# Patient Record
Sex: MALE | Race: WHITE
[De-identification: names, ages, dates, MRNs, and addresses within clinical notes are randomized per-mention and may not be internally consistent; named-entity substitution may affect disease eponyms.]

---

## 2017-06-26 ENCOUNTER — HOSPITAL ENCOUNTER (EMERGENCY)
Dept: HOSPITAL 47 - EC | Age: 42
Discharge: HOME | End: 2017-06-26
Payer: COMMERCIAL

## 2017-06-26 VITALS
HEART RATE: 62 BPM | TEMPERATURE: 97.9 F | DIASTOLIC BLOOD PRESSURE: 55 MMHG | RESPIRATION RATE: 14 BRPM | SYSTOLIC BLOOD PRESSURE: 105 MMHG

## 2017-06-26 DIAGNOSIS — F17.200: ICD-10-CM

## 2017-06-26 DIAGNOSIS — Z88.0: ICD-10-CM

## 2017-06-26 DIAGNOSIS — S23.3XXA: Primary | ICD-10-CM
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
## 2017-06-26 PROCEDURE — 96372 THER/PROPH/DIAG INJ SC/IM: CPT

## 2017-06-26 PROCEDURE — 72110 X-RAY EXAM L-2 SPINE 4/>VWS: CPT

## 2017-06-26 PROCEDURE — 99283 EMERGENCY DEPT VISIT LOW MDM: CPT

## 2017-06-26 PROCEDURE — 72072 X-RAY EXAM THORAC SPINE 3VWS: CPT

## 2017-06-26 NOTE — ED
Back Pain HPI





- General


Chief Complaint: Back Pain/Injury


Stated Complaint: Back Pain


Time Seen by Provider: 06/26/17 16:36


Source: patient


Limitations: no limitations





- History of Present Illness


Initial Comments: 


Patient is a 42-year-old male presenting to the emergency department with 

complaints of mid back pain.  Patient states he was at the [facility] 

yesterday and first tripped over a cord while he might hurt his back.  Patient 

then participated in a [employer] rides which exacerbated his back pain.  Patient 

states he used to have problems with back pain but not anymore.  Patient is 

currently rating his pain 6 out of 10, described as sharp, exacerbated with 

flexion and twisting.  Patient denies recent illness, fevers, nausea, vomiting, 

shortness of breath, chest pain, abdominal pain, numbness or tingling, urinary 

or fecal incontinence, or saddle anesthesia.  Patient is ambulatory.  Patient 

states he took Tylenol prior to arrival.








- Related Data


 Home Medications











 Medication  Instructions  Recorded  Confirmed


 


No Known Home Medications [No  06/26/17 06/26/17





Known Home Medications]   











 Allergies











Allergy/AdvReac Type Severity Reaction Status Date / Time


 


Penicillins Allergy  Anaphylaxis Verified 06/26/17 16:43














Review of Systems


ROS Statement: 


Those systems with pertinent positive or pertinent negative responses have been 

documented in the HPI.





ROS Other: All systems not noted in ROS Statement are negative.





Past Medical History


Past Medical History: No Reported History


History of Any Multi-Drug Resistant Organisms: None Reported


Past Surgical History: No Surgical Hx Reported


Additional Past Surgical History / Comment(s): smokes 1 ppd


Past Psychological History: No Psychological Hx Reported


Smoking Status: Current every day smoker


Past Alcohol Use History: None Reported


Past Drug Use History: None Reported





- Past Family History


  ** Mother


History Unknown: Yes (Cardiac history)





General Exam





- General Exam Comments


Initial Comments: 


GENERAL: Pt awake and alert, well-appearing, well-nourished, and in no acute 

distress.


HEAD: Atraumatic, normocephalic.


EYES: Pupils equal, round, and reactive to light, extraocular movements intact, 

sclera anicteric, conjunctiva are normal.


ENT: Oropharynx clear without exudates.  Moist mucous membranes. 


NECK:Normal range of motion, supple without lymphadenopathy. 


LUNGS: Breath sounds clear to auscultation bilaterally.  No wheezes, rales, or 

rhonchi.


HEART: Heart S1, S2, no S3 or S4.  Regular rate and rhythm.  No murmurs, rubs 

or gallops.


ABDOMEN: Soft, nontender, nondistended, normoactive bowel sounds.  No guarding, 

no rebound.  No masses or organomegaly appreciated. 


Spine: Full range of motion of spine with flexion and extension even though it'

s painful.  Straight leg raise negative bilaterally.  5 out of 5 strength in 

hips/knees/ankles.  Normal gait.  Vertebral tenderness to mid back.


EXTREMITIES: 2+ peripheral pulses. No edema. No calf tenderness.


NEUROLOGICAL: Pt oriented x 3.  No focal deficits noted.  Strength and 

sensation grossly intact.


PSYCH: Normal mood, normal affect.


SKIN: Warm, dry, intact. Normal turgor. No rashes or lesions.








Limitations: no limitations





Course


 Vital Signs











  06/26/17 06/26/17





  16:09 17:35


 


Temperature 98.4 F 97.9 F


 


Pulse Rate 70 62


 


Respiratory 20 14





Rate  


 


Blood Pressure 101/59 105/55


 


O2 Sat by Pulse 98 96





Oximetry  














Medical Decision Making





- Medical Decision Making


Acute mid back pain suspect secondary to strain.  Spine and lumbar X-ray 

negative for acute fracture or dislocation.  Patient given Toradol IM in the 

emergency department improvement.  Patient instructed to follow-up with primary 

care physician or orthopedic service if pain does not improve.  Discharge 

instructions and return parameters reviewed.








- Radiology Data


Radiology results: report reviewed


X-ray thoracic spine x-ray lumbar spine: Thoracic and lumbar spine without 

vertebral compression collapse or malalignment.  Mild degenerative disc disease 

L5 to S1.  Slight rotary levoconvex curvature/scoliosis of the lumbar spine.





Disposition


Clinical Impression: 


 Thoracic back sprain





Disposition: HOME SELF-CARE


Condition: Good


Instructions:  Acute Low Back Pain (ED)


Additional Instructions: 


Continue Tylenol or Motrin for pain.  May apply warm compresses or ice for 

relief.  Follow-up with primary care physician as directed.  If pain persists 

follow-up with orthopedic service.


Referrals: 


Cheri Johnson MD [Primary Care Provider] - 1-2 days


Hermes Portillo MD [STAFF PHYSICIAN] - 1-2 days


Time of Disposition: 17:10

## 2017-06-26 NOTE — XR
EXAMINATION TYPE:

 

3 views thoracic spine.

5 views lumbar spine.

 

DATE OF EXAM: 6/26/2017

 

COMPARISON: NONE

 

HISTORY: 42-year-old male with back pain after fall yesterday

 

FINDINGS: 

 

Thoracic spine:

12 rib-bearing thoracic vertebral bodies. All pedicles are visualized. Vertebral body heights are pre
served and alignment is maintained.

 

Lumbar spine:

Slight rotary levoconvex curvature of the lumbar spine with 5 lumbar type vertebral bodies. Mild dege
nerative disc disease at L5-S1 with a limbus vertebra of the inferior L5. Vertebral body heights are 
preserved and alignment is maintained.

 

 

IMPRESSION: 

1. Thoracic and lumbar spine without vertebral compression collapse or malalignment.

2. Mild degenerative disc disease L5-S1.

3. Slight rotary levoconvex curvature/scoliosis of the lumbar spine.

## 2018-06-15 ENCOUNTER — HOSPITAL ENCOUNTER (EMERGENCY)
Dept: HOSPITAL 47 - EC | Age: 43
Discharge: HOME | End: 2018-06-15
Payer: COMMERCIAL

## 2018-06-15 VITALS
RESPIRATION RATE: 16 BRPM | SYSTOLIC BLOOD PRESSURE: 114 MMHG | DIASTOLIC BLOOD PRESSURE: 70 MMHG | TEMPERATURE: 98 F | HEART RATE: 57 BPM

## 2018-06-15 DIAGNOSIS — F17.210: ICD-10-CM

## 2018-06-15 DIAGNOSIS — S01.511A: Primary | ICD-10-CM
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
## 2018-06-15 DIAGNOSIS — Z97.2: ICD-10-CM

## 2018-06-15 DIAGNOSIS — Z88.0: ICD-10-CM

## 2018-06-15 PROCEDURE — 12011 RPR F/E/E/N/L/M 2.5 CM/<: CPT

## 2018-06-15 PROCEDURE — 99283 EMERGENCY DEPT VISIT LOW MDM: CPT

## 2018-06-15 NOTE — ED
ENT HPI





- General


Chief complaint: Dental/Oral


Stated complaint: Mouth Injury


Time Seen by Provider: 06/15/18 04:35


Source: patient


Mode of arrival: ambulatory


Limitations: no limitations





- History of Present Illness


Initial comments: 





This patient is a 43-year-old man who states that he was working on his car 

when he sustained a lip laceration.  The patient states that he was in the 

process of swelling and hammer back and it struck his upper lip.  He noticed 

that there was a laceration and he presents here to see about having this 

repaired.  Patient denies any other injury.  Patient states that he was wearing 

dentures and that these were cracked.  He denies any injury other than that.


MD complaint: other (Lip laceration)


Onset/Timin


-: hour(s)


Location: upper lip


Severity: mild


Quality: dull


Consistency: constant


Improves with: none


Worsens with: none





- Related Data


 Previous Rx's











 Medication  Instructions  Recorded


 


Cephalexin [Keflex] 500 mg PO Q6HR #28 cap 06/15/18


 


Ibuprofen [Motrin] 600 mg PO Q8HR PRN #20 tab 06/15/18











 Allergies











Allergy/AdvReac Type Severity Reaction Status Date / Time


 


Penicillins Allergy  Anaphylaxis Verified 06/15/18 04:29














Review of Systems


ROS Statement: 


Those systems with pertinent positive or pertinent negative responses have been 

documented in the HPI.





ROS Other: All systems not noted in ROS Statement are negative.


Constitutional: Denies: fever


ENT: Reports: other (Lip laceration).  Denies: throat pain, epistaxis


Respiratory: Denies: cough, dyspnea


Hematological/Lymphatic: Denies: easy bleeding





Past Medical History


Past Medical History: No Reported History


History of Any Multi-Drug Resistant Organisms: None Reported


Past Surgical History: No Surgical Hx Reported


Additional Past Surgical History / Comment(s): smokes 1 ppd


Past Psychological History: No Psychological Hx Reported


Smoking Status: Current every day smoker


Past Alcohol Use History: None Reported


Past Drug Use History: None Reported





- Past Family History


  ** Mother


History Unknown: Yes (Cardiac history)





General Exam


Limitations: no limitations


General appearance: alert, in no apparent distress


Head exam: Present: atraumatic, normocephalic


Eye exam: Present: normal appearance.  Absent: scleral icterus, conjunctival 

injection


ENT exam: Present: other (The patient has an approximately 2 cm laceration to 

the upper lip bilaterally.  The oral mucosa.)


Skin exam: Present: warm, dry, normal color





Course


 Vital Signs











  06/15/18





  04:26


 


Temperature 98 F


 


Pulse Rate 57 L


 


Respiratory 16





Rate 


 


Blood Pressure 114/70


 


O2 Sat by Pulse 98





Oximetry 














Procedures





- Laceration


  ** Laceration #1


Consent Obtained: verbal consent


Indication: laceration


Site: lip


Description: linear, clean


Depth: simple, single layer


Anesthetic Used: lidocaine 1%


Anesthesia Technique: local infiltration


Type of Sutures: vicryl


Size of Sutures: 5-0


Number of Sutures: 2


Technique: simple, interrupted


Patient Tolerated Procedure: well, no complications





Disposition


Clinical Impression: 


 Lip laceration





Disposition: HOME SELF-CARE


Condition: Good


Instructions:  Laceration (ED)


Prescriptions: 


Cephalexin [Keflex] 500 mg PO Q6HR #28 cap


Ibuprofen [Motrin] 600 mg PO Q8HR PRN #20 tab


 PRN Reason: Pain


Is patient prescribed a controlled substance at d/c from ED?: No


Referrals: 


None,Stated [Primary Care Provider] - 1-2 days

## 2019-07-05 ENCOUNTER — HOSPITAL ENCOUNTER (EMERGENCY)
Dept: HOSPITAL 47 - EC | Age: 44
Discharge: HOME | End: 2019-07-05
Payer: COMMERCIAL

## 2019-07-05 VITALS
HEART RATE: 67 BPM | TEMPERATURE: 97.5 F | DIASTOLIC BLOOD PRESSURE: 76 MMHG | RESPIRATION RATE: 18 BRPM | SYSTOLIC BLOOD PRESSURE: 118 MMHG

## 2019-07-05 DIAGNOSIS — S39.012A: Primary | ICD-10-CM
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
## 2019-07-05 DIAGNOSIS — Z88.0: ICD-10-CM

## 2019-07-05 DIAGNOSIS — F17.200: ICD-10-CM
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
## 2019-07-05 PROCEDURE — 99283 EMERGENCY DEPT VISIT LOW MDM: CPT

## 2019-07-05 NOTE — ED
Back Pain HPI





- General


Chief Complaint: Back Pain/Injury


Stated Complaint: Back Pain


Time Seen by Provider: 19 05:39


Source: patient, family


Limitations: no limitations





- History of Present Illness


Initial Comments: 





This patient is a 44-year-old man who believes that he strained his back.  The 

patient states that he had gone to help his father with some yard work.  He st

ates that he had pulled palate out of a grassy area, and believes he strained 

his low back.  He states that that had been couple of days ago and then over the

course of the next day he developed stiffness and aching to the low back.  He is

not having any weakness or numbness of the extremities.  No change in urination 

or bowel movements.  No abdominal pain.  No saddle anesthesia.


MD Complaint: back injury


Onset/Timin


-: days(s)


Similar Symptoms Previously: Yes


Place: street


Radiation: none


Severity: moderate


Quality: aching


Consistency: constant


Improves With: walking, other (Standing)


Worsens With: sitting upright


Context: while lifting


Associated Symptoms: denies other symptoms





- Related Data


                                  Previous Rx's











 Medication  Instructions  Recorded


 


Cephalexin [Keflex] 500 mg PO Q6HR #28 cap 06/15/18


 


Ibuprofen [Motrin] 600 mg PO Q8HR PRN #20 tab 06/15/18


 


Ibuprofen [Motrin] 600 mg PO Q8HR PRN #20 tab 19


 


Methocarbamol [Robaxin-750] 750 mg PO TID PRN #30 tablet 19











                                    Allergies











Allergy/AdvReac Type Severity Reaction Status Date / Time


 


Penicillins Allergy  Anaphylaxis Verified 19 05:07














Review of Systems


ROS Statement: 


Those systems with pertinent positive or pertinent negative responses have been 

documented in the HPI.





ROS Other: All systems not noted in ROS Statement are negative.


Constitutional: Denies: fever, chills, weakness


Respiratory: Denies: cough, dyspnea


Cardiovascular: Denies: chest pain, edema


Gastrointestinal: Denies: abdominal pain


Genitourinary: Denies: dysuria, hematuria, testicular pain, testicular mass


Musculoskeletal: Reports: as per HPI, back pain


Skin: Denies: rash


Neurological: Denies: weakness, numbness, paresthesias





Past Medical History


Past Medical History: No Reported History


History of Any Multi-Drug Resistant Organisms: None Reported


Past Surgical History: No Surgical Hx Reported


Additional Past Surgical History / Comment(s): smokes 1 ppd


Past Psychological History: No Psychological Hx Reported


Smoking Status: Current every day smoker


Past Alcohol Use History: None Reported


Past Drug Use History: None Reported





- Past Family History


  ** Mother


History Unknown: Yes (Cardiac history)





General Exam


Limitations: no limitations


General appearance: alert, in no apparent distress


Respiratory exam: Present: normal lung sounds bilaterally.  Absent: respiratory 

distress, wheezes, rales, rhonchi, stridor


Cardiovascular Exam: Present: regular rate, normal rhythm, normal heart sounds. 

Absent: systolic murmur, diastolic murmur, gallop


GI/Abdominal exam: Present: soft.  Absent: distended, tenderness, guarding, 

rebound, rigid, pulsatile mass


Extremities exam: Present: normal inspection, normal capillary refill.  Absent: 

pedal edema, calf tenderness


Back exam: Present: normal inspection, muscle spasm.  Absent: CVA tenderness 

(R), CVA tenderness (L), vertebral tenderness


Neurological exam: Present: alert, normal gait, reflexes normal.  Absent: motor 

sensory deficit


Skin exam: Present: warm, dry, intact, normal color.  Absent: rash





Course


                                   Vital Signs











  19





  05:03


 


Temperature 97.5 F L


 


Pulse Rate 67


 


Respiratory 18





Rate 


 


Blood Pressure 118/76


 


O2 Sat by Pulse 99





Oximetry 














Disposition


Clinical Impression: 


 Strain of lumbar region





Disposition: HOME SELF-CARE


Condition: Good


Instructions (If sedation given, give patient instructions):  Acute Low Back 

Pain (ED)


Prescriptions: 


Ibuprofen [Motrin] 600 mg PO Q8HR PRN #20 tab


 PRN Reason: Pain


Methocarbamol [Robaxin-750] 750 mg PO TID PRN #30 tablet


 PRN Reason: pain


Is patient prescribed a controlled substance at d/c from ED?: No


Referrals: 


None,Stated [Primary Care Provider] - 1-2 days

## 2021-07-16 ENCOUNTER — HOSPITAL ENCOUNTER (EMERGENCY)
Dept: HOSPITAL 47 - EC | Age: 46
Discharge: HOME | End: 2021-07-16
Payer: COMMERCIAL

## 2021-07-16 VITALS
DIASTOLIC BLOOD PRESSURE: 90 MMHG | TEMPERATURE: 98.1 F | HEART RATE: 57 BPM | RESPIRATION RATE: 20 BRPM | SYSTOLIC BLOOD PRESSURE: 107 MMHG

## 2021-07-16 DIAGNOSIS — F17.210: ICD-10-CM

## 2021-07-16 DIAGNOSIS — M54.5: Primary | ICD-10-CM

## 2021-07-16 DIAGNOSIS — Z88.0: ICD-10-CM

## 2021-07-16 PROCEDURE — 99283 EMERGENCY DEPT VISIT LOW MDM: CPT

## 2021-07-16 PROCEDURE — 96372 THER/PROPH/DIAG INJ SC/IM: CPT

## 2021-07-16 NOTE — ED
General Adult HPI





- General


Chief complaint: Back Pain/Injury


Stated complaint: Back pain


Time Seen by Provider: 07/16/21 06:01


Source: patient, RN notes reviewed


Mode of arrival: ambulatory


Limitations: no limitations





- History of Present Illness


Initial comments: 





46-year-old male without significant past medical history presents to the 

emergency room for a chief complaint of back pain.  Patient reports he has left-

sided back pain.  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 

and a metal factory lifting heavy metals.  Patient reports that this morning 

when he woke up his pain was worse in his back felt more stiff.  States this 

feels like exactly a few years ago when he strained his back.  Patient denies 

bladder or bowel changes, saddle anesthesia, weakness of the lower extremities, 

radiating pain, or fevers.  RPatient has no other complaints at this time 

including shortness of breath, chest pain, abdominal pain, nausea or vomiting, 

headache, or visual changes.





- Related Data


                                  Previous Rx's











 Medication  Instructions  Recorded


 


Cephalexin [Keflex] 500 mg PO Q6HR #28 cap 06/15/18


 


Ibuprofen [Motrin] 600 mg PO Q8HR PRN #20 tab 06/15/18


 


Ibuprofen [Motrin] 600 mg PO Q8HR PRN #20 tab 07/05/19


 


Methocarbamol [Robaxin-750] 750 mg PO TID PRN #30 tablet 07/05/19


 


Cyclobenzaprine [Flexeril] 10 mg PO TID #14 tab 07/16/21











                                    Allergies











Allergy/AdvReac Type Severity Reaction Status Date / Time


 


Penicillins Allergy  Anaphylaxis Verified 07/16/21 05:34














Review of Systems


ROS Statement: 


Those systems with pertinent positive or pertinent negative responses have been 

documented in the HPI.





ROS Other: All systems not noted in ROS Statement are negative.





Past Medical History


Past Medical History: No Reported History


History of Any Multi-Drug Resistant Organisms: None Reported


Past Surgical History: No Surgical Hx Reported


Additional Past Surgical History / Comment(s): smokes 1 ppd


Past Psychological History: No Psychological Hx Reported


Smoking Status: Current every day smoker


Past Alcohol Use History: None Reported


Past Drug Use History: None Reported





- Past Family History


  ** Mother


History Unknown: Yes (Cardiac history)





General Exam


Limitations: no limitations


General appearance: alert, in no apparent distress


Head exam: Present: atraumatic, normocephalic, normal inspection


Eye exam: Present: normal appearance, PERRL, EOMI.  Absent: scleral icterus, 

conjunctival injection, periorbital swelling


ENT exam: Present: normal exam, mucous membranes moist


Neck exam: Present: normal inspection, full ROM.  Absent: tenderness


Respiratory exam: Present: normal lung sounds bilaterally.  Absent: respiratory 

distress, wheezes


Cardiovascular Exam: Present: regular rate, normal rhythm, normal heart sounds


GI/Abdominal exam: Present: soft, normal bowel sounds.  Absent: distended, 

tenderness, guarding, rebound, rigid


Extremities exam: Present: normal capillary refill (Capillary refill less than 2

 seconds, DP pulse 2+ in bilateral lower extremities.)


Back exam: Present: paraspinal tenderness (L Sided lumbar paraspinal tenderness 

near SI joint.).  Absent: vertebral tenderness





Course





                                   Vital Signs











  07/16/21





  05:30


 


Temperature 98.1 F


 


Pulse Rate 57 L


 


Respiratory 20





Rate 


 


Blood Pressure 107/90


 


O2 Sat by Pulse 100





Oximetry 














Medical Decision Making





- Medical Decision Making





Patient is well-appearing.  Patient presents for left-sided back pain 

exacerbated by lifting.  He denies red flag symptoms.  Patient is able to flex 

lumbar spine to about 45.  Pain is worsened with movement such as getting out 

of bed and standing up straight.  Neurovascular status intact in bilateral lower

 extremities.  Patient denies any injuries.  Patient was given Toradol and 

Norflex.  He will be discharged home with muscle relaxers but is aware he cannot

 drive while taking this.  Patient will be given extra days of work per his 

request.  He will return for any worsening symptoms.  He will follow up with or

thopedics.





Disposition


Clinical Impression: 


 Mechanical back pain





Disposition: HOME SELF-CARE


Condition: Good


Instructions (If sedation given, give patient instructions):  Acute Low Back 

Pain (ED)


Additional Instructions: 


Take Motrin and Tylenol for pain alternating every 3 hours as needed.  Take 

muscle relaxer as needed but do not drive or operate machinery while taking 

this.  Follow-up with your doctor as well as orthopedics in one to 2 days.  You 

may need MRI if pain is persistent.  Return to the emergency room for any 

worsening symptoms.


Prescriptions: 


Cyclobenzaprine [Flexeril] 10 mg PO TID #14 tab


Is patient prescribed a controlled substance at d/c from ED?: No


Referrals: 


Shiraz Seo MD [STAFF PHYSICIAN] - 1-2 days


Time of Disposition: 06:49

## 2021-08-05 ENCOUNTER — HOSPITAL ENCOUNTER (EMERGENCY)
Dept: HOSPITAL 47 - EC | Age: 46
Discharge: HOME | End: 2021-08-05
Payer: COMMERCIAL

## 2021-08-05 VITALS — TEMPERATURE: 98.2 F | DIASTOLIC BLOOD PRESSURE: 82 MMHG | SYSTOLIC BLOOD PRESSURE: 126 MMHG

## 2021-08-05 VITALS — HEART RATE: 75 BPM | RESPIRATION RATE: 18 BRPM

## 2021-08-05 DIAGNOSIS — Z88.0: ICD-10-CM

## 2021-08-05 DIAGNOSIS — S61.411A: Primary | ICD-10-CM

## 2021-08-05 DIAGNOSIS — F17.200: ICD-10-CM

## 2021-08-05 DIAGNOSIS — Z23: ICD-10-CM
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
## 2021-08-05 PROCEDURE — 90715 TDAP VACCINE 7 YRS/> IM: CPT

## 2021-08-05 PROCEDURE — 73130 X-RAY EXAM OF HAND: CPT

## 2021-08-05 PROCEDURE — 12002 RPR S/N/AX/GEN/TRNK2.6-7.5CM: CPT

## 2021-08-05 PROCEDURE — 99283 EMERGENCY DEPT VISIT LOW MDM: CPT

## 2021-08-05 PROCEDURE — 90471 IMMUNIZATION ADMIN: CPT

## 2021-08-05 NOTE — ED
General Adult HPI





- General


Chief complaint: Wound/Laceration


Stated complaint: R hand laceration


Time Seen by Provider: 08/05/21 14:00


Source: patient, RN notes reviewed


Mode of arrival: ambulatory


Limitations: no limitations





- History of Present Illness


Initial comments: 





46-year-old male presents to the emergency room for a chief complaint of 

laceration of the right hand.  Patient was moving a fridge and did not notice 

there is a piece of metal that cut his right hand.  He denies any difficulty 

moving the fingers.  Tetanus is up-to-date 3 years ago.  Patient wanted to glue 

it but thought it might require sutures.Patient has no other complaints at this 

time including shortness of breath, chest pain, abdominal pain, nausea or 

vomiting, headache, or visual changes.





- Related Data


                                  Previous Rx's











 Medication  Instructions  Recorded


 


Cephalexin [Keflex] 500 mg PO Q6HR #28 cap 06/15/18


 


Ibuprofen [Motrin] 600 mg PO Q8HR PRN #20 tab 06/15/18


 


Ibuprofen [Motrin] 600 mg PO Q8HR PRN #20 tab 07/05/19


 


Methocarbamol [Robaxin-750] 750 mg PO TID PRN #30 tablet 07/05/19


 


Cyclobenzaprine [Flexeril] 10 mg PO TID #14 tab 07/16/21











                                    Allergies











Allergy/AdvReac Type Severity Reaction Status Date / Time


 


Penicillins Allergy  Anaphylaxis Verified 08/05/21 13:49














Review of Systems


ROS Statement: 


Those systems with pertinent positive or pertinent negative responses have been 

documented in the HPI.





ROS Other: All systems not noted in ROS Statement are negative.





Past Medical History


Past Medical History: No Reported History


History of Any Multi-Drug Resistant Organisms: None Reported


Past Surgical History: No Surgical Hx Reported


Additional Past Surgical History / Comment(s): smokes 1 ppd


Past Psychological History: No Psychological Hx Reported


Smoking Status: Current every day smoker


Past Alcohol Use History: None Reported


Past Drug Use History: None Reported





- Past Family History


  ** Mother


History Unknown: Yes (Cardiac history)





General Exam


Limitations: no limitations


General appearance: alert, in no apparent distress


Head exam: Present: atraumatic, normocephalic, normal inspection


Eye exam: Present: normal appearance, PERRL, EOMI.  Absent: scleral icterus, 

conjunctival injection, periorbital swelling


ENT exam: Present: normal exam, mucous membranes moist


Neck exam: Present: normal inspection, full ROM.  Absent: tenderness, 

meningismus, lymphadenopathy


Respiratory exam: Present: normal lung sounds bilaterally.  Absent: respiratory 

distress, wheezes, rales, rhonchi, stridor


Cardiovascular Exam: Present: regular rate, normal rhythm, normal heart sounds. 

 Absent: systolic murmur, diastolic murmur, rubs, gallop, clicks


Extremities exam: Present: full ROM (Full range of motion of all digits of the 

right hand.), normal capillary refill (Capillary refill less than 2 seconds in 

the right hand.  Radial pulse 2+.), other (Patient has a 3 cm laceration on the 

dorsal aspect of the right hand between the first and second metacarpals)





Course


                                   Vital Signs











  08/05/21 08/05/21





  13:49 15:01


 


Temperature 98.2 F 98.2 F


 


Pulse Rate 69 75


 


Respiratory 16 18





Rate  


 


Blood Pressure 126/82 


 


O2 Sat by Pulse 97 98





Oximetry  














Procedures





- Laceration


  ** Laceration #1


Consent Obtained: verbal consent


Indication: laceration


Site: hand


Size (cm): 3


Description: linear


Depth: simple, single layer


Anesthetic Used: lidocaine 1%


Anesthesia Technique: local infiltration


Amount (mls): 3


Pre-repair: wound explored, irrigated extensively


Type of Sutures: nylon


Size of Sutures: 5-0


Number of Sutures: 5


Patient Tolerated Procedure: well, no complications





Medical Decision Making





- Medical Decision Making





  X-ray shows no acute fracture.  There is linear lucency consistent with 

laceration over base of the first metacarpal without radiodense foreign body.  

Patient's wound was irrigated with saline pressure irrigation.  Laceration was 

repaired of the right hand.  Discussed care parameters.  Discussed following up 

with primary care for recheck and returning in 7-10 days for suture removal





Disposition


Clinical Impression: 


 Laceration





Disposition: HOME SELF-CARE


Condition: Good


Instructions (If sedation given, give patient instructions):  Care For Your 

Stitches (ED), Laceration (ED)


Additional Instructions: 


Keep area clean.  Apply antibiotic ointment as needed.  Monitor for signs of 

infection such as spreading or streaking redness, drainage, or fever and return 

if these occur.  Return if you have any other worsening symptoms.


Is patient prescribed a controlled substance at d/c from ED?: No


Referrals: 


Sukh Vega MD [REFERRING] - 1-2 days


Time of Disposition: 14:14

## 2021-08-05 NOTE — XR
EXAMINATION TYPE: XR hand complete RT

 

DATE OF EXAM: 8/5/2021

 

CLINICAL HISTORY: Laceration injury with pain

 

TECHNIQUE:  Frontal, lateral and oblique images of the right hand are obtained.

 

COMPARISON: None.

 

FINDINGS:  There is no acute fracture/dislocation evident in the right hand. The joint spaces in the 
hand appear within normal limits. Linear lucency consistent with laceration over base of first metaca
rpal identified. No suspicious radiodense foreign body seen.

 

IMPRESSION: As above.

## 2021-11-29 ENCOUNTER — HOSPITAL ENCOUNTER (EMERGENCY)
Dept: HOSPITAL 47 - EC | Age: 46
Discharge: HOME | End: 2021-11-29
Payer: COMMERCIAL

## 2021-11-29 VITALS — SYSTOLIC BLOOD PRESSURE: 108 MMHG | DIASTOLIC BLOOD PRESSURE: 79 MMHG | HEART RATE: 98 BPM

## 2021-11-29 VITALS — RESPIRATION RATE: 18 BRPM | TEMPERATURE: 98.3 F

## 2021-11-29 DIAGNOSIS — F12.90: ICD-10-CM

## 2021-11-29 DIAGNOSIS — Z88.0: ICD-10-CM

## 2021-11-29 DIAGNOSIS — F17.200: ICD-10-CM

## 2021-11-29 DIAGNOSIS — M54.6: Primary | ICD-10-CM

## 2021-11-29 LAB
ALBUMIN SERPL-MCNC: 4.1 G/DL (ref 3.5–5)
ALP SERPL-CCNC: 57 U/L (ref 38–126)
ALT SERPL-CCNC: 20 U/L (ref 4–49)
ANION GAP SERPL CALC-SCNC: 8 MMOL/L
AST SERPL-CCNC: 28 U/L (ref 17–59)
BASOPHILS # BLD AUTO: 0.1 K/UL (ref 0–0.2)
BASOPHILS NFR BLD AUTO: 1 %
BUN SERPL-SCNC: 24 MG/DL (ref 9–20)
CALCIUM SPEC-MCNC: 9.5 MG/DL (ref 8.4–10.2)
CHLORIDE SERPL-SCNC: 104 MMOL/L (ref 98–107)
CO2 SERPL-SCNC: 22 MMOL/L (ref 22–30)
EOSINOPHIL # BLD AUTO: 0.1 K/UL (ref 0–0.7)
EOSINOPHIL NFR BLD AUTO: 2 %
ERYTHROCYTE [DISTWIDTH] IN BLOOD BY AUTOMATED COUNT: 4.82 M/UL (ref 4.3–5.9)
ERYTHROCYTE [DISTWIDTH] IN BLOOD: 12.6 % (ref 11.5–15.5)
GLUCOSE SERPL-MCNC: 128 MG/DL (ref 74–99)
HCT VFR BLD AUTO: 44.5 % (ref 39–53)
HGB BLD-MCNC: 15.6 GM/DL (ref 13–17.5)
LYMPHOCYTES # SPEC AUTO: 0.9 K/UL (ref 1–4.8)
LYMPHOCYTES NFR SPEC AUTO: 17 %
MAGNESIUM SPEC-SCNC: 1.7 MG/DL (ref 1.6–2.3)
MCH RBC QN AUTO: 32.4 PG (ref 25–35)
MCHC RBC AUTO-ENTMCNC: 35.1 G/DL (ref 31–37)
MCV RBC AUTO: 92.5 FL (ref 80–100)
MONOCYTES # BLD AUTO: 0.6 K/UL (ref 0–1)
MONOCYTES NFR BLD AUTO: 10 %
NEUTROPHILS # BLD AUTO: 3.8 K/UL (ref 1.3–7.7)
NEUTROPHILS NFR BLD AUTO: 68 %
PLATELET # BLD AUTO: 132 K/UL (ref 150–450)
POTASSIUM SERPL-SCNC: 4.7 MMOL/L (ref 3.5–5.1)
PROT SERPL-MCNC: 6.9 G/DL (ref 6.3–8.2)
SODIUM SERPL-SCNC: 134 MMOL/L (ref 137–145)
WBC # BLD AUTO: 5.6 K/UL (ref 3.8–10.6)

## 2021-11-29 PROCEDURE — 85025 COMPLETE CBC W/AUTO DIFF WBC: CPT

## 2021-11-29 PROCEDURE — 36415 COLL VENOUS BLD VENIPUNCTURE: CPT

## 2021-11-29 PROCEDURE — 84484 ASSAY OF TROPONIN QUANT: CPT

## 2021-11-29 PROCEDURE — 80053 COMPREHEN METABOLIC PANEL: CPT

## 2021-11-29 PROCEDURE — 99284 EMERGENCY DEPT VISIT MOD MDM: CPT

## 2021-11-29 PROCEDURE — 74176 CT ABD & PELVIS W/O CONTRAST: CPT

## 2021-11-29 PROCEDURE — 93005 ELECTROCARDIOGRAM TRACING: CPT

## 2021-11-29 PROCEDURE — 83735 ASSAY OF MAGNESIUM: CPT

## 2021-11-29 PROCEDURE — 71046 X-RAY EXAM CHEST 2 VIEWS: CPT

## 2021-11-29 NOTE — ED
Back Pain HPI





- General


Chief Complaint: Back Pain/Injury


Stated Complaint: back pain


Time Seen by Provider: 11/29/21 04:05


Source: patient, RN notes reviewed, old records reviewed


Limitations: no limitations





- History of Present Illness


Initial Comments: 





This is a 46-year-old male to the emergency.  Patient presents today for 

evaluation in regards to not feeling well.  Patient has had severe back pain 

throughout the course of the day.  Back pain thoracic back pain with radiation 

or blades radiating down lower back.  Severe pain with some onset but has 

progressively getting worse since started.  No trauma.  No fever cough or 

congestion.  No other complaints patient originally was just muscle spasm but 

symptoms have gone away are progressively


MD Complaint: back pain


-: days(s) (2)


Similar Symptoms Previously: Yes


Place: home


Radiation: buttocks


Severity: moderate


Severity scale (1-10): 4


Quality: sharp, aching


Consistency: constant


Improves With: none


Worsens With: none


Context: while lifting, turning/twisting, unknown


Associated Symptoms: denies other symptoms


Treatments Prior to Arrival: other (none)





- Related Data


                                  Previous Rx's











 Medication  Instructions  Recorded


 


Cephalexin [Keflex] 500 mg PO Q6HR #28 cap 06/15/18


 


Ibuprofen [Motrin] 600 mg PO Q8HR PRN #20 tab 06/15/18


 


Ibuprofen [Motrin] 600 mg PO Q8HR PRN #20 tab 07/05/19


 


Methocarbamol [Robaxin-750] 750 mg PO TID PRN #30 tablet 07/05/19


 


Cyclobenzaprine [Flexeril] 10 mg PO TID #14 tab 07/16/21











                                    Allergies











Allergy/AdvReac Type Severity Reaction Status Date / Time


 


Penicillins Allergy  Anaphylaxis Verified 11/29/21 01:56














Review of Systems


ROS Statement: 


Those systems with pertinent positive or pertinent negative responses have been 

documented in the HPI.





ROS Other: All systems not noted in ROS Statement are negative.





Past Medical History


Past Medical History: No Reported History


History of Any Multi-Drug Resistant Organisms: None Reported


Past Surgical History: No Surgical Hx Reported


Additional Past Surgical History / Comment(s): smokes 1 ppd


Past Psychological History: No Psychological Hx Reported


Smoking Status: Current every day smoker


Past Alcohol Use History: None Reported


Past Drug Use History: Marijuana





- Past Family History


  ** Mother


History Unknown: Yes (Cardiac history)





General Exam


Limitations: no limitations


General appearance: alert, in no apparent distress


Head exam: Present: atraumatic, normocephalic, normal inspection


Eye exam: Present: normal appearance, PERRL, EOMI.  Absent: scleral icterus, 

conjunctival injection, periorbital swelling


ENT exam: Present: normal exam, mucous membranes moist


Neck exam: Present: normal inspection.  Absent: tenderness, meningismus, 

lymphadenopathy


Respiratory exam: Present: normal lung sounds bilaterally.  Absent: respiratory 

distress, wheezes, rales, rhonchi, stridor


Cardiovascular Exam: Present: regular rate, normal rhythm, normal heart sounds. 

 Absent: systolic murmur, diastolic murmur, rubs, gallop, clicks


GI/Abdominal exam: Present: soft, normal bowel sounds.  Absent: distended, 

tenderness, guarding, rebound, rigid


Extremities exam: Present: normal inspection, full ROM, normal capillary refill.

  Absent: tenderness, pedal edema, joint swelling, calf tenderness


Back exam: Present: normal inspection


Neurological exam: Present: alert, oriented X3, CN II-XII intact


Psychiatric exam: Present: normal affect, normal mood


Skin exam: Present: warm, dry, intact, normal color.  Absent: rash





Course


                                   Vital Signs











  11/29/21 11/29/21





  01:52 05:58


 


Temperature 98.3 F 98.3 F


 


Pulse Rate 100 98


 


Respiratory 18 18





Rate  


 


Blood Pressure 106/69 108/79


 


O2 Sat by Pulse 98 98





Oximetry  














- Reevaluation(s)


Reevaluation #1: 





Medical record is reviewed





Patient is improved symptoms here in the ER, no complaints





Patient informed results and questions answered





Medical Decision Making





- Medical Decision Making





Lasix male DF for evaluation of back pain severe back pain upper and lower back 

pain back pain in the back of the shoulders which she feels like makes control 

breathing and pain in his lower back extremities to his buttocks and groin.  Pa

ruby is normal imaging here in the ER no other complaints





- Lab Data


Result diagrams: 


                                 11/29/21 02:07





                                 11/29/21 02:07


                                   Lab Results











  11/29/21 11/29/21 11/29/21 Range/Units





  02:07 02:07 02:07 


 


WBC  5.6    (3.8-10.6)  k/uL


 


RBC  4.82    (4.30-5.90)  m/uL


 


Hgb  15.6    (13.0-17.5)  gm/dL


 


Hct  44.5    (39.0-53.0)  %


 


MCV  92.5    (80.0-100.0)  fL


 


MCH  32.4    (25.0-35.0)  pg


 


MCHC  35.1    (31.0-37.0)  g/dL


 


RDW  12.6    (11.5-15.5)  %


 


Plt Count  132 L    (150-450)  k/uL


 


MPV  7.6    


 


Neutrophils %  68    %


 


Lymphocytes %  17    %


 


Monocytes %  10    %


 


Eosinophils %  2    %


 


Basophils %  1    %


 


Neutrophils #  3.8    (1.3-7.7)  k/uL


 


Lymphocytes #  0.9 L    (1.0-4.8)  k/uL


 


Monocytes #  0.6    (0-1.0)  k/uL


 


Eosinophils #  0.1    (0-0.7)  k/uL


 


Basophils #  0.1    (0-0.2)  k/uL


 


Sodium   134 L   (137-145)  mmol/L


 


Potassium   4.7   (3.5-5.1)  mmol/L


 


Chloride   104   ()  mmol/L


 


Carbon Dioxide   22   (22-30)  mmol/L


 


Anion Gap   8   mmol/L


 


BUN   24 H   (9-20)  mg/dL


 


Creatinine   1.00   (0.66-1.25)  mg/dL


 


Est GFR (CKD-EPI)AfAm   >90   (>60 ml/min/1.73 sqM)  


 


Est GFR (CKD-EPI)NonAf   90   (>60 ml/min/1.73 sqM)  


 


Glucose   128 H   (74-99)  mg/dL


 


Calcium   9.5   (8.4-10.2)  mg/dL


 


Magnesium   1.7   (1.6-2.3)  mg/dL


 


Total Bilirubin   0.2   (0.2-1.3)  mg/dL


 


AST   28   (17-59)  U/L


 


ALT   20   (4-49)  U/L


 


Alkaline Phosphatase   57   ()  U/L


 


Troponin I    <0.012  (0.000-0.034)  ng/mL


 


Total Protein   6.9   (6.3-8.2)  g/dL


 


Albumin   4.1   (3.5-5.0)  g/dL














- Radiology Data


Radiology results: report reviewed (Chest x-ray and CT abdomen and pelvis 

negative for acute disease), image reviewed





Disposition


Clinical Impression: 


 Mechanical back pain, Thoracic back pain, Mid back pain





Disposition: HOME SELF-CARE


Condition: Good


Instructions (If sedation given, give patient instructions):  Acute Low Back 

Pain (ED)


Is patient prescribed a controlled substance at d/c from ED?: No


Referrals: 


None,Stated [Primary Care Provider] - 1-2 days

## 2021-11-29 NOTE — CT
EXAMINATION TYPE: CT abdomen pelvis wo con

 

DATE OF EXAM: 11/29/2021

 

COMPARISON: None

 

HISTORY: BACK PAIN

 

CT DLP: 340.7 mGycm

Automated exposure control for dose reduction was used.

 

Images obtained from the diaphragm to the floor the pelvis with no contrast.

 

Lung bases are clear. There is no pleural effusion. Heart size is normal. There is no pericardial eff
usion.

 

There is some wall thickening of the gastric fundus. Spleen is intact. There is no pancreatic mass. G
allbladder appears normal. Liver shows no focal defect. The bile ducts are not dilated.

 

There is no adrenal mass. Kidneys have normal size. There is no hydronephrosis. Ureters are not dilat
ed. There is no retroperitoneal adenopathy. Bladder has mild wall thickening. There is no inguinal he
rnia. There is no evidence of a pelvic mass. There is no free fluid in the pelvis. The appendix is me
dial and appears normal.

 

The lumbar vertebra appear intact. There is no compression fracture. Bony pelvis is intact. Hip joint
s are intact. Sacroiliac joints appear normal. There is a slight lumbar levoscoliosis.

 

There is no mesenteric edema. There is no ascites or free air. There is no bowel obstruction.

 

IMPRESSION:

There is mild wall thickening of the gastric fundus that could relate to hypertrophic gastritis.

 

Normal appendix.

 

Mild urinary bladder wall thickening could be some nonspecific cystitis.

## 2021-11-29 NOTE — XR
EXAMINATION TYPE: XR chest 2V

 

DATE OF EXAM: 11/29/2021

 

COMPARISON: 11/12/2015

 

HISTORY: Chest pain

 

TECHNIQUE: 2 views

 

FINDINGS: Heart and mediastinum are normal. Lungs are clear. Diaphragm is normal. Bony thorax appears
 normal.

 

IMPRESSION: Normal chest. No adverse change.